# Patient Record
Sex: MALE | URBAN - METROPOLITAN AREA
[De-identification: names, ages, dates, MRNs, and addresses within clinical notes are randomized per-mention and may not be internally consistent; named-entity substitution may affect disease eponyms.]

---

## 2021-04-29 ENCOUNTER — OFFICE VISIT (OUTPATIENT)
Dept: URBAN - METROPOLITAN AREA CLINIC 48 | Facility: CLINIC | Age: 21
End: 2021-04-29
Payer: COMMERCIAL

## 2021-04-29 PROCEDURE — 92004 COMPRE OPH EXAM NEW PT 1/>: CPT | Performed by: OPHTHALMOLOGY

## 2021-04-29 ASSESSMENT — INTRAOCULAR PRESSURE
OD: 19
OS: 17

## 2021-04-29 NOTE — IMPRESSION/PLAN
Impression: Chalazion right upper eyelid: H00.11. Plan: Chalazion X 2 one  lasrger than pea size. Recommend to do warm compresses 3-5 times a day for 5 minutes. Schedule Excision of chalazion RUL Next Friday please get auth

## 2021-05-07 ENCOUNTER — PROCEDURE (OUTPATIENT)
Dept: URBAN - METROPOLITAN AREA CLINIC 48 | Facility: CLINIC | Age: 21
End: 2021-05-07
Payer: COMMERCIAL

## 2021-05-07 PROCEDURE — 67800 REMOVE EYELID LESION: CPT | Performed by: OPHTHALMOLOGY

## 2021-05-07 RX ORDER — NEOMYCIN, POLYMYXIN B SULFATES, DEXAMETHASONE 1; 3.5; 1 MG/G; MG/G; [USP'U]/G
OINTMENT OPHTHALMIC
Qty: 1 | Refills: 1 | Status: INACTIVE
Start: 2021-05-07 | End: 2022-02-01

## 2021-05-20 ENCOUNTER — OFFICE VISIT (OUTPATIENT)
Dept: URBAN - METROPOLITAN AREA CLINIC 48 | Facility: CLINIC | Age: 21
End: 2021-05-20
Payer: COMMERCIAL

## 2021-05-20 DIAGNOSIS — H00.11 CHALAZION RIGHT UPPER EYELID: Primary | ICD-10-CM

## 2021-05-20 PROCEDURE — 99213 OFFICE O/P EST LOW 20 MIN: CPT | Performed by: OPTOMETRIST

## 2021-05-20 RX ORDER — CEPHALEXIN 500 MG/1
500 MG CAPSULE ORAL
Qty: 20 | Refills: 0 | Status: INACTIVE
Start: 2021-05-20 | End: 2022-02-01

## 2021-05-20 RX ORDER — CEPHALEXIN 750 MG/1
750 MG CAPSULE ORAL
Qty: 20 | Refills: 0 | Status: INACTIVE
Start: 2021-05-20 | End: 2021-05-20

## 2021-05-20 NOTE — IMPRESSION/PLAN
Impression: Chalazion right upper eyelid: H00.11. Plan: Chalazion X 2  still present on exam.  Recommend to do warm compresses 3-5 times a day for 5 minutes. Start Keflex PO BID x10 days Recommend evaluation with Dr. Alan Kaur for possible removal 

RTC 3-4 weeks for lid eval with Dr. Alan Kaur

## 2022-02-01 ENCOUNTER — OFFICE VISIT (OUTPATIENT)
Dept: URBAN - METROPOLITAN AREA CLINIC 50 | Facility: CLINIC | Age: 22
End: 2022-02-01
Payer: COMMERCIAL

## 2022-02-01 PROCEDURE — 99214 OFFICE O/P EST MOD 30 MIN: CPT | Performed by: OPHTHALMOLOGY

## 2022-02-01 ASSESSMENT — INTRAOCULAR PRESSURE
OS: 12
OD: 11

## 2022-02-01 NOTE — IMPRESSION/PLAN
Impression: Chalazion right upper eyelid: H00.11. Plan: Patient prefers incision and drainage of chalazion. Will follow up in 2-3 weeks needs auth.

## 2022-02-16 ENCOUNTER — OFFICE VISIT (OUTPATIENT)
Dept: URBAN - METROPOLITAN AREA CLINIC 50 | Facility: CLINIC | Age: 22
End: 2022-02-16
Payer: COMMERCIAL

## 2022-02-16 PROCEDURE — 67515 INJECT/TREAT EYE SOCKET: CPT | Performed by: OPHTHALMOLOGY

## 2022-02-16 PROCEDURE — 67801 REMOVE EYELID LESIONS: CPT | Performed by: OPHTHALMOLOGY

## 2022-02-16 RX ORDER — NEOMYCIN SULFATE, POLYMYXIN B SULFATE AND DEXAMETHASONE 3.5; 10000; 1 MG/ML; [USP'U]/ML; MG/ML
SUSPENSION OPHTHALMIC
Qty: 5 | Refills: 0 | Status: INACTIVE
Start: 2022-02-16 | End: 2022-02-16

## 2022-02-16 ASSESSMENT — INTRAOCULAR PRESSURE
OS: 16
OD: 16

## 2022-02-16 NOTE — IMPRESSION/PLAN
Impression: Chalazion right upper eyelid: H00.11. Plan: Patient prefers incision and drainage of chalazion. Will be done in the office after administration of local anesthesia. After procedure will apply patch. Patch may be removed at home after bleeding and discharge stops. Additionally will inject subconjunctival kenalog to prevent recurrence.  MANUELL

## 2022-03-23 ENCOUNTER — OFFICE VISIT (OUTPATIENT)
Dept: URBAN - METROPOLITAN AREA CLINIC 50 | Facility: CLINIC | Age: 22
End: 2022-03-23
Payer: COMMERCIAL

## 2022-03-23 PROCEDURE — 99212 OFFICE O/P EST SF 10 MIN: CPT | Performed by: OPHTHALMOLOGY

## 2022-03-23 ASSESSMENT — INTRAOCULAR PRESSURE
OS: 14
OD: 14

## 2022-03-23 NOTE — IMPRESSION/PLAN
Impression: Chalazion right upper eyelid: H00.11. Plan: Patient is advised to use warm compresses at least twice a day if bump comes back. resolved.
37.3